# Patient Record
(demographics unavailable — no encounter records)

---

## 2025-05-16 NOTE — PHYSICAL EXAM
[Normal Appearance] : normal in appearance [No Masses] : no palpable masses [No Axillary Lymphadenopathy] : no axillary lymphadenopathy [Declined Rectal Exam] : declined rectal exam [de-identified] : General: NAD HEENT: NC/AT, EOMI, PERRLA, pharynx moist and pink, no exudate. Neck: supple, no JVD. CVS: S1, S2 normal, RRR, no m/g/r Resp: CTA b/l, no wheeze/rale/rhonchi Abdomen: soft, NT/ND, positive bowel sounds. no HSM. Extremities: no edema, peripheral pulses 2+ bilaterally.  Neuro: aaox3, 5/5 motor strength in all 4 extremities. normal sensation, normal gait.  Psych: normal affect, no SI/HI.

## 2025-05-16 NOTE — ASSESSMENT
[Vaccines Reviewed] : Immunizations reviewed today. Please see immunization details in the vaccine log within the immunization flowsheet.  [FreeTextEntry1] : All preventative measures were reviewed with the patient and the patient is due for and agrees to the following as outlined  in the plan  below.

## 2025-05-16 NOTE — HEALTH RISK ASSESSMENT
[No] : In the past 12 months have you used drugs other than those required for medical reasons? No [Never] : Never [Good] : ~his/her~ current health as good [No falls in past year] : Patient reported no falls in the past year [0] : 2) Feeling down, depressed, or hopeless: Not at all (0) [HIV Test offered] : HIV Test offered [Hepatitis C test offered] : Hepatitis C test offered [With Significant Other] : lives with significant other [Employed] : employed [] :  [Sexually Active] : sexually active [Fully functional (bathing, dressing, toileting, transferring, walking, feeding)] : Fully functional (bathing, dressing, toileting, transferring, walking, feeding) [Smoke Detector] : smoke detector [Carbon Monoxide Detector] : carbon monoxide detector [Seat Belt] :  uses seat belt [Sunscreen] : uses sunscreen [Name: ___] : Health Care Proxy's Name: [unfilled]  [I will adhere to the patient's wishes.] : I will adhere to the patient's wishes. [NO] : No [de-identified] : rodger [de-identified] : healthy [EyeExamDate] : n/a  [Reports changes in hearing] : Reports no changes in hearing [Reports changes in vision] : Reports no changes in vision [Reports changes in dental health] : Reports no changes in dental health [MammogramDate] : n/a [PapSmearDate] : n/a [FreeTextEntry2] :  [AdvancecareDate] : 5/16/25

## 2025-05-16 NOTE — HISTORY OF PRESENT ILLNESS
[FreeTextEntry1] : cpe/ establish care [de-identified] : 45 y/o F no know PMhx here to establish care and needs a cpe  fasting today reports allergies more eye feels also allergic to food, needs referral to allergist reports low energy  HCM: initial vaccine tdap:due skin exam due